# Patient Record
Sex: FEMALE | Race: WHITE | Employment: OTHER | URBAN - METROPOLITAN AREA
[De-identification: names, ages, dates, MRNs, and addresses within clinical notes are randomized per-mention and may not be internally consistent; named-entity substitution may affect disease eponyms.]

---

## 2017-07-10 NOTE — PROGRESS NOTES
Mike Been : 1940     HPI:   Patient presents with:  Seizure Disorder (neurologic): LOV: 16. F/U on seizures. Patient states that she has been doing good since last visit. She has not had any seizures.  She has continued with depakot mirtazapine (REMERON) 30 MG Oral Tab Take 1 tablet (30 mg total) by mouth nightly.  Disp: 30 tablet Rfl: 3   Montelukast Sodium (SINGULAIR) 10 MG Oral Tab TAKE ONE TABLET BY MOUTH EVERY NIGHT Disp: 30 tablet Rfl: 3   sucralfate (CARAFATE) 1 G Oral Tab YESSY Comment:Coffee - 1 cup per day           ROS:   GENERAL HEALTH: feels well otherwise  SKIN: denies any unusual skin lesions or rashes  EYES: no visual complaints or deficits  HEENT: denies nasal congestion, sinus pain or sore throat; hearing loss negative controlled on Depakote and the same dose will be continued. Recent CBC and liver function tests were unremarkable, and I did review these results with the patient and family.   I discussed restarting medicine for her memory, and suggested she take Aricept

## 2017-08-03 ENCOUNTER — HOSPITAL ENCOUNTER (EMERGENCY)
Facility: HOSPITAL | Age: 77
Discharge: HOME | End: 2017-08-03
Attending: EMERGENCY MEDICINE
Payer: MEDICARE

## 2017-08-03 ENCOUNTER — APPOINTMENT (OUTPATIENT)
Dept: GENERAL RADIOLOGY | Facility: HOSPITAL | Age: 77
End: 2017-08-03
Attending: EMERGENCY MEDICINE
Payer: MEDICARE

## 2017-08-03 VITALS
OXYGEN SATURATION: 95 % | HEART RATE: 83 BPM | RESPIRATION RATE: 20 BRPM | SYSTOLIC BLOOD PRESSURE: 109 MMHG | HEIGHT: 62 IN | DIASTOLIC BLOOD PRESSURE: 56 MMHG | TEMPERATURE: 98 F | BODY MASS INDEX: 23.92 KG/M2 | WEIGHT: 130 LBS
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
## 2017-08-03 DIAGNOSIS — S39.012A LOW BACK STRAIN, INITIAL ENCOUNTER: ICD-10-CM

## 2017-08-03 LAB
ANION GAP SERPL CALC-SCNC: 12 MMOL/L (ref 0–18)
BASOPHILS # BLD: 0 K/UL (ref 0–0.2)
BASOPHILS NFR BLD: 1 %
BUN SERPL-MCNC: 11 MG/DL (ref 8–20)
BUN/CREAT SERPL: 13.3 (ref 10–20)
CALCIUM SERPL-MCNC: 9.2 MG/DL (ref 8.5–10.5)
CHLORIDE SERPL-SCNC: 105 MMOL/L (ref 95–110)
CO2 SERPL-SCNC: 25 MMOL/L (ref 22–32)
CREAT SERPL-MCNC: 0.83 MG/DL (ref 0.5–1.5)
EOSINOPHIL # BLD: 0.2 K/UL (ref 0–0.7)
EOSINOPHIL NFR BLD: 3 %
ERYTHROCYTE [DISTWIDTH] IN BLOOD BY AUTOMATED COUNT: 14 % (ref 11–15)
ETHANOL SERPL-MCNC: 186 MG/DL
GLUCOSE SERPL-MCNC: 109 MG/DL (ref 70–99)
HCT VFR BLD AUTO: 38.2 % (ref 35–48)
HGB BLD-MCNC: 12.9 G/DL (ref 12–16)
LYMPHOCYTES # BLD: 1.7 K/UL (ref 1–4)
LYMPHOCYTES NFR BLD: 34 %
MCH RBC QN AUTO: 31.5 PG (ref 27–32)
MCHC RBC AUTO-ENTMCNC: 33.8 G/DL (ref 32–37)
MCV RBC AUTO: 93.1 FL (ref 80–100)
MONOCYTES # BLD: 0.2 K/UL (ref 0–1)
MONOCYTES NFR BLD: 5 %
NEUTROPHILS # BLD AUTO: 2.9 K/UL (ref 1.8–7.7)
NEUTROPHILS NFR BLD: 57 %
OSMOLALITY UR CALC.SUM OF ELEC: 294 MOSM/KG (ref 275–295)
PLATELET # BLD AUTO: 170 K/UL (ref 140–400)
PMV BLD AUTO: 7.1 FL (ref 7.4–10.3)
POTASSIUM SERPL-SCNC: 3.9 MMOL/L (ref 3.3–5.1)
RBC # BLD AUTO: 4.1 M/UL (ref 3.7–5.4)
SODIUM SERPL-SCNC: 142 MMOL/L (ref 136–144)
VALPROATE SERPL-MCNC: 61 MCG/ML (ref 50–100)
WBC # BLD AUTO: 5 K/UL (ref 4–11)

## 2017-08-03 PROCEDURE — 99285 EMERGENCY DEPT VISIT HI MDM: CPT

## 2017-08-03 PROCEDURE — 93010 ELECTROCARDIOGRAM REPORT: CPT | Performed by: EMERGENCY MEDICINE

## 2017-08-03 PROCEDURE — 93005 ELECTROCARDIOGRAM TRACING: CPT

## 2017-08-03 PROCEDURE — 72100 X-RAY EXAM L-S SPINE 2/3 VWS: CPT | Performed by: EMERGENCY MEDICINE

## 2017-08-03 PROCEDURE — 72220 X-RAY EXAM SACRUM TAILBONE: CPT | Performed by: EMERGENCY MEDICINE

## 2017-08-03 PROCEDURE — 80048 BASIC METABOLIC PNL TOTAL CA: CPT | Performed by: EMERGENCY MEDICINE

## 2017-08-03 PROCEDURE — 85025 COMPLETE CBC W/AUTO DIFF WBC: CPT | Performed by: EMERGENCY MEDICINE

## 2017-08-03 PROCEDURE — 80320 DRUG SCREEN QUANTALCOHOLS: CPT | Performed by: EMERGENCY MEDICINE

## 2017-08-03 PROCEDURE — 80164 ASSAY DIPROPYLACETIC ACD TOT: CPT | Performed by: EMERGENCY MEDICINE

## 2017-08-03 PROCEDURE — 96360 HYDRATION IV INFUSION INIT: CPT

## 2017-08-03 RX ORDER — VIT A/VIT C/VIT E/ZINC/COPPER 2148-113
TABLET ORAL
COMMUNITY

## 2017-08-03 NOTE — ED INITIAL ASSESSMENT (HPI)
Hx dementia. Fall a home. Denies any pain. +ETOH reports 7 drinks today of wine.  Denies head injury

## 2017-08-04 NOTE — ED PROVIDER NOTES
Patient Seen in: [facility] AND [facility] Emergency Department    History   Patient presents with:  Fall (musculoskeletal, neurologic)    Stated Complaint: ETOH Fall    HPI    Patient is a 44-year-old female from home with history of dementia who presents with EVERY NIGHT   sucralfate (CARAFATE) 1 G Oral Tab,  TAKE ONE TABLET BY MOUTH FOUR TIMES A DAY   omeprazole (PRILOSEC) 20 MG Oral Capsule Delayed Release,  TAKE ONE CAPSULE BY MOUTH EVERY DAY BEFORE A MEAL   Albuterol Sulfate HFA (VENTOLIN HFA) 108 (90 BASE) Hips stable  Neuro: CN intact, normal speech, 5/5 motor strength in all extremities, no focal deficits  SKIN: warm, dry, no rashes        ED Course     Labs Reviewed   BASIC METABOLIC PANEL (8) - Abnormal; Notable for the following:        Result Value moderate levoscoliosis of the lumbar spine. 4. Lower lumbar degenerative changes, most pronounced at L5-S1. Family at bedside, notified of normal xrays. Pt continues to deny any pain or symptoms. Feels comfortable with discharge. Able to ambulate.

## 2017-08-04 NOTE — ED NOTES
Discharge instructions reviewed with patient including when to follow up with healthcare providers and when to seek emergency care. Peripheral IV discontinued. Patient dressed self. Ambulated to exit with steady gait.

## 2018-01-18 ENCOUNTER — OFFICE VISIT (OUTPATIENT)
Dept: NEUROLOGY | Facility: CLINIC | Age: 78
End: 2018-01-18

## 2018-01-18 VITALS
RESPIRATION RATE: 16 BRPM | HEIGHT: 64 IN | WEIGHT: 130 LBS | DIASTOLIC BLOOD PRESSURE: 86 MMHG | SYSTOLIC BLOOD PRESSURE: 136 MMHG | HEART RATE: 80 BPM | BODY MASS INDEX: 22.2 KG/M2

## 2018-01-18 DIAGNOSIS — G40.909 SEIZURE DISORDER (HCC): Primary | ICD-10-CM

## 2018-01-18 DIAGNOSIS — R41.89 COGNITIVE IMPAIRMENT: ICD-10-CM

## 2018-01-18 DIAGNOSIS — I67.1 CEREBRAL ANEURYSM: ICD-10-CM

## 2018-01-18 PROCEDURE — 99213 OFFICE O/P EST LOW 20 MIN: CPT | Performed by: OTHER

## 2018-01-18 RX ORDER — DIVALPROEX SODIUM 500 MG/1
500 TABLET, DELAYED RELEASE ORAL DAILY
COMMUNITY
End: 2018-08-29

## 2018-01-18 RX ORDER — DONEPEZIL HYDROCHLORIDE 10 MG/1
10 TABLET, FILM COATED ORAL NIGHTLY
COMMUNITY

## 2018-01-18 NOTE — PROGRESS NOTES
Viry Bautistar : 1940     HPI:   Patient presents with:  Seizures: Patient presents for follow up on seizures, denied recent seizures. Taking depakote 500 mg 2 tabs daily at night.        Mayela Hood was seen for neurologic evaluation J MOUTH EVERY NIGHT Disp: 30 tablet Rfl: 3   sucralfate (CARAFATE) 1 G Oral Tab TAKE ONE TABLET BY MOUTH FOUR TIMES A DAY (Patient taking differently: Takes 1 tab daily) Disp: 120 tablet Rfl: 3   omeprazole (PRILOSEC) 20 MG Oral Capsule Delayed Release TAKE any unusual skin lesions or rashes  EYES: no visual complaints or deficits  HEENT: denies nasal congestion, sinus pain or sore throat; hearing loss negative  RESPIRATORY: denies shortness of breath, wheezing or cough   CARDIOVASCULAR: denies chest pain or suggesting a dementia. She has been stable, raising the possibility some of the issues may be secondary to postop changes noted on the previous CT scan. Clinically she has been doing well, so the same medications will be continued.   The daughter will yazan

## 2018-08-29 ENCOUNTER — OFFICE VISIT (OUTPATIENT)
Dept: NEUROLOGY | Facility: CLINIC | Age: 78
End: 2018-08-29
Payer: MEDICARE

## 2018-08-29 VITALS
HEIGHT: 63 IN | DIASTOLIC BLOOD PRESSURE: 64 MMHG | HEART RATE: 72 BPM | SYSTOLIC BLOOD PRESSURE: 110 MMHG | WEIGHT: 120 LBS | BODY MASS INDEX: 21.26 KG/M2

## 2018-08-29 DIAGNOSIS — R41.89 COGNITIVE IMPAIRMENT: ICD-10-CM

## 2018-08-29 DIAGNOSIS — R56.9 SEIZURE (HCC): Primary | ICD-10-CM

## 2018-08-29 PROCEDURE — 99214 OFFICE O/P EST MOD 30 MIN: CPT | Performed by: OTHER

## 2018-08-29 RX ORDER — DIVALPROEX SODIUM 500 MG/1
1000 TABLET, DELAYED RELEASE ORAL DAILY
Qty: 180 TABLET | Refills: 3 | Status: SHIPPED | OUTPATIENT
Start: 2018-08-29 | End: 2019-08-23

## 2018-08-29 NOTE — PROGRESS NOTES
Neurology Follow up Visit     Referred By: Dr. Leiva ref. provider found    Chief Complaint: Patient presents with:  Seizures: LOV w/Dr Rodriguez Bias 1-18-18 pt is here to f/u on seizures.  pt denies any new episodes since last office visit, currently taking dival Cause of death   • Cancer Other    • Diabetes Other    • Stroke Sister 80         Current Outpatient Prescriptions:   •  divalproex Sodium 500 MG Oral Tab EC DR tab, Take 2 tablets (1,000 mg total) by mouth daily.  Takes 2 daily at night, Disp: 180 tabl Neurological:     Mental Status- Alert and oriented x2.   Normal attention span and concentration  Thought process intact  Memory  mildly impaired  Mood intact  Fund of knowledge appropriate for education and age    Language intact including: comprehens 08/03/2017   CO2 25 08/03/2017      I have reviewed labs. Assessment   1. Seizure [facility])  Patient with a well-controlled epilepsy, however her recent level of Depakote checked couple months ago was some bone, it can be rechecked.   In the meantime we wi

## 2018-09-06 ENCOUNTER — MED REC SCAN ONLY (OUTPATIENT)
Dept: NEUROLOGY | Facility: CLINIC | Age: 78
End: 2018-09-06

## 2018-09-18 RX ORDER — DONEPEZIL HYDROCHLORIDE 10 MG/1
TABLET, FILM COATED ORAL
Qty: 90 TABLET | Refills: 3 | Status: SHIPPED | OUTPATIENT
Start: 2018-09-18 | End: 2019-06-26

## 2019-06-26 ENCOUNTER — OFFICE VISIT (OUTPATIENT)
Dept: NEUROLOGY | Facility: CLINIC | Age: 79
End: 2019-06-26
Payer: MEDICARE

## 2019-06-26 VITALS
WEIGHT: 129 LBS | RESPIRATION RATE: 16 BRPM | HEART RATE: 76 BPM | SYSTOLIC BLOOD PRESSURE: 118 MMHG | HEIGHT: 59 IN | DIASTOLIC BLOOD PRESSURE: 68 MMHG | BODY MASS INDEX: 26 KG/M2

## 2019-06-26 DIAGNOSIS — G40.909 SEIZURE DISORDER (HCC): ICD-10-CM

## 2019-06-26 DIAGNOSIS — R41.89 COGNITIVE IMPAIRMENT: ICD-10-CM

## 2019-06-26 DIAGNOSIS — R56.9 SEIZURE (HCC): Primary | ICD-10-CM

## 2019-06-26 PROCEDURE — 99214 OFFICE O/P EST MOD 30 MIN: CPT | Performed by: OTHER

## 2019-06-26 RX ORDER — DOCUSATE SODIUM 100 MG/1
100 CAPSULE, LIQUID FILLED ORAL 2 TIMES DAILY PRN
COMMUNITY

## 2019-06-26 RX ORDER — DIVALPROEX SODIUM 250 MG/1
250 TABLET, EXTENDED RELEASE ORAL DAILY
Qty: 90 TABLET | Refills: 3 | Status: SHIPPED | OUTPATIENT
Start: 2019-06-26

## 2019-06-26 NOTE — PATIENT INSTRUCTIONS
Caregiver Resources:    [facility] ([facility] [place]):     of Senior Services: Sonu Mckenzie   Phone:   [phone] or  [phone]  Fax:   [phone]  TDD:   [phone]  Office Hours:   8 a.m. - 4:30 p.m.   Monday -

## 2019-07-13 ENCOUNTER — HOSPITAL ENCOUNTER (OUTPATIENT)
Dept: MAMMOGRAPHY | Facility: HOSPITAL | Age: 79
Discharge: HOME | End: 2019-07-13
Attending: PEDIATRICS
Payer: MEDICARE

## 2019-07-13 DIAGNOSIS — Z12.31 ENCOUNTER FOR SCREENING MAMMOGRAM FOR MALIGNANT NEOPLASM OF BREAST: ICD-10-CM

## 2019-07-13 PROCEDURE — 77067 SCR MAMMO BI INCL CAD: CPT | Performed by: PEDIATRICS

## 2019-07-13 PROCEDURE — 77063 BREAST TOMOSYNTHESIS BI: CPT | Performed by: PEDIATRICS

## 2019-08-23 RX ORDER — DIVALPROEX SODIUM 500 MG/1
TABLET, DELAYED RELEASE ORAL
Qty: 180 TABLET | Refills: 2 | Status: SHIPPED | OUTPATIENT
Start: 2019-08-23

## 2019-08-23 NOTE — TELEPHONE ENCOUNTER
Divalproex 500mg. Take 2 tablet daily at HS.  #180    Last filled-8/29/2018    LOV-6/26/2019  NOV-none

## 2019-09-05 RX ORDER — DONEPEZIL HYDROCHLORIDE 10 MG/1
TABLET, FILM COATED ORAL
Qty: 90 TABLET | Refills: 2 | Status: SHIPPED | OUTPATIENT
Start: 2019-09-05 | End: 2020-06-01

## 2020-06-01 RX ORDER — DONEPEZIL HYDROCHLORIDE 10 MG/1
TABLET, FILM COATED ORAL
Qty: 90 TABLET | Refills: 0 | Status: SHIPPED | OUTPATIENT
Start: 2020-06-01

## 2020-07-08 RX ORDER — DIVALPROEX SODIUM 250 MG/1
TABLET, EXTENDED RELEASE ORAL
Qty: 90 TABLET | Refills: 0 | OUTPATIENT
Start: 2020-07-08

## 2020-08-18 ENCOUNTER — APPOINTMENT (OUTPATIENT)
Dept: GENERAL RADIOLOGY | Facility: HOSPITAL | Age: 80
End: 2020-08-18
Attending: EMERGENCY MEDICINE
Payer: MEDICARE

## 2020-08-18 PROCEDURE — 71045 X-RAY EXAM CHEST 1 VIEW: CPT | Performed by: EMERGENCY MEDICINE

## 2020-08-18 RX ORDER — DIVALPROEX SODIUM 500 MG/1
TABLET, DELAYED RELEASE ORAL
Qty: 180 TABLET | Refills: 0 | OUTPATIENT
Start: 2020-08-18

## 2020-08-18 NOTE — CM/SW NOTE
Spoke to [place] at the pt's bedside. Provided her with information on \"[facility]\" and Myrna's #. Demetria Jones will be calling her today. The pt will be going home with [place] today and her brother will also assist in caring for their mom.  The pt lives i

## 2020-08-18 NOTE — CM/SW NOTE
I spoke to Alejo Roth, the pt's daughter, and she will be coming to the ED within the hour. She will be taking her mom home with her but is looking for solutions in caring for her mom. I will speak with her when she arrives.

## 2020-08-18 NOTE — ED INITIAL ASSESSMENT (HPI)
Pt via EMS after medics found pt in her apartment with her  roommate who apparently had been  for several days per medics report of smell and flies.   Pt was found eating a blueberry muffin and horseradish and sitting next to the  austen

## 2020-08-18 NOTE — ED PROVIDER NOTES
Patient Seen in: [facility] AND [facility] Emergency Department      History   Patient presents with:  Wellness Visit    Stated Complaint: Wellness check    HPI    57-year-old female with history of asthma, cerebral aneurysm, seizure disorder, and memory loss (d Current:/79   Pulse 83   Temp 98 °F (36.7 °C) (Oral)   Resp 20   Ht 152.4 cm (5')   Wt 45.4 kg   SpO2 98%   BMI 19.53 kg/m²         Physical Exam      General Appearance: alert, no distress  Eyes: pupils equal and round no pallor or injection The patient's daughter now accompanies the patient in the ED. She states that the patient is functioning at her baseline mental status with worsening dementia over the past couple of years.   The patient and her daughter spoke with the  who is

## 2020-08-18 NOTE — CM/SW NOTE
The pt is confused and is not oriented to place or time. She has a son and daughter but does not know where they live. She does not know who her Dr is or what street she lives on.  She does not know why she is here, and not realizing that \"here\" is the [facility]

## 2020-08-18 NOTE — CM/SW NOTE
I spoke to ΠΑΦΟΣ at [facility] and provided information about the referral in assistance for memory care placement. She will contact [place] today.

## 2020-08-26 RX ORDER — DONEPEZIL HYDROCHLORIDE 10 MG/1
TABLET, FILM COATED ORAL
Qty: 90 TABLET | Refills: 0 | OUTPATIENT
Start: 2020-08-26

## 2020-08-26 NOTE — ED NOTES
Spoke to Elisa at [facility] who states that there are no appropriate beds right now but that the packet will be faxed to [facility] to be reviewed after 9 AM. She also states that because the packet is \"active\", the patient will be considered if

## 2020-08-26 NOTE — ED NOTES
Pt is accepted at [facility] by Dr. Lopes Crew. Pt will be going to [address] [place] [address], bed 3303-2. Tommie requests the pt be sent at 8pm.    RN notified.

## 2020-08-26 NOTE — ED NOTES
Transfer Summary     JASON - no appropriate beds  Elsa Arenas accepted clinical information but was told there are 5 people ahead of the patient, packet faxed for review  Lawyer Garcia accepted based clinical information and packet was faxed for review.

## 2020-08-26 NOTE — ED NOTES
Spoke w/ pt's daughter. Updated her that I did speak to MARI about an hour ago and they were still waiting on a decision from [facility], they planned to call me as soon as they heard if there would be a bed. If not, I will contact [facility] again.  Will call P

## 2020-08-26 NOTE — ED NOTES
Pt accepted at [facility] by Dr. Roseann Rico Requested an updated P&C. States they will call back and let us know when we can send the pt.

## 2020-08-26 NOTE — ED NOTES
Gave report to Mariusz at [facility].   Requests medication list to be faxed to [phone] or [phone]

## 2020-08-26 NOTE — ED NOTES
[employer] - called for an update.  Spoke with Nikia Hills and she said they were told the POA would not approve transfer so they have not been working on request.

## 2020-08-26 NOTE — ED PROVIDER NOTES
Patient Seen in: [facility] Emergency Department      History   Patient presents with:  Eval-P    Stated Complaint: SI dementia    HPI    The patient is a 71-year-old female with a history of dementia that is been worsening over the last year per 96   Resp 18   Temp 98.1 °F (36.7 °C)   Temp src Oral   SpO2 97 %   O2 Device None (Room air)       Current:/74   Pulse 96   Temp 98.1 °F (36.7 °C) (Oral)   Resp 18   Wt 45.4 kg   SpO2 97%   BMI 19.53 kg/m²         Physical Exam  Vitals signs and ag BASIC METABOLIC PANEL (8) - Abnormal; Notable for the following components:       Result Value    Glucose 117 (*)     BUN/CREA Ratio 22.7 (*)     Calculated Osmolality 299 (*)     All other components within normal limits   URINALYSIS WITH CULTURE REFLEX unspecified dementia type [facility])    Disposition:  Psychiatric transfer  8/25/2020 10:26 pm    Follow-up:  No follow-up provider specified.   We recommend that you schedule follow up care with a primary care provider within the next three months to obtain bas

## 2020-08-26 NOTE — BH LEVEL OF CARE ASSESSMENT
Level of Care Assessment Note    General Questions  Why are you here?: \"I don't rememebr what I did\"  Precipitating Events: Patient has been staying with her daughter recently and she is concerned about the change in patient's behavior.  Patient has been I have been trying to get her melatonin. I have to go to work and I am worried what she might do. \"  Family's Biggest Areas of Concern: Safety.  Daughter is worried about her constantly saying she is going to kill herself and her frequently saying she is g a firearm owner ID card?: No  Collateral for any access to means/firearms/weapons: No guns/weapons in the daughter's home    Self Injury  History of Self-Injurious Behaviors More than 30 Days Ago: No  Present Self-Injurious Behaviors Within the Last 30 Day Glasses  Patient able to understand and follow directions?: Yes  Patient able to express needs?: Yes  Feeding and Swallowing  History of aspiration or choking?: No  Feeding assistance needed?: No  Patient have any chewing or swallowing problems?: No  Speci alcohol? : Never       Illicit and Prescription Drug Use  Which if any illicit/prescription drugs have you used/abused?: Denies                                                                Support for Recovery  Is your living environment a supportive analilia Affect: Stable  Attitude toward staff: Co-operative  Speech  Rate of Speech: Appropriate  Flow of Speech: Appropriate  Intensity of Volume: Ordinary  Clarity: Clear  Cognition  Concentration: Unimpaired  Orientation Level: Oriented to person;Oriented to si Factors: Current/past psychiatric disorder;Current suicidal behavior; Environmental stress; No current treatment  Protective Factors: Family    Motivational Stage of Change  Motivational Stage of Change: Pre-Contemplative    Level of Care Recommendations  Co

## 2020-08-26 NOTE — ED NOTES
Met with patient's daughter who is the medical POA to help her complete a petition for psychiatric hospitalization. After consulting with the MD, patient's daughter for explained the POC, admission process and mental health rights.  She signed her mother in

## 2020-08-26 NOTE — ED NOTES
Jeanine Knowles still has no official update from [facility].

## 2020-08-26 NOTE — ED NOTES
Spoke with Elsa. States there still are no beds at [facility]. Requested that I speak with the POA (daughter) to see if she would be open to other facilities. Spoke with pt's daughter and she is open to [employer] and [facility].  Called Elsa back to rela

## 2020-08-26 NOTE — ED NOTES
Iris will review after 9a - this is POA's preference. [facility] continuing to review and does have possibility of bed per overnight.

## 2020-08-26 NOTE — ED NOTES
Iris still in their morning meeting. [address] waiting to hear back and will contact writer with update asap.

## 2020-08-26 NOTE — ED INITIAL ASSESSMENT (HPI)
Pt brought in by daughter. Daughter reports pt has been saying she want to kill herself for past 2 days. Pt has a hx of dementia. Pt denies SI/HI statements, denies SI/HI at this time. Stating \"I don't remember saying that\".

## 2020-08-27 NOTE — ED NOTES
Realized that though pt's POA requested [facility]. The pt's POA was not told that the pt was accepted and transferred. Spoke with Keyur Frias (kimberly and DIONE) to notify her.

## 2020-08-27 NOTE — ED NOTES
Pt sitting in hallway in chair with seclusion in place. Pt is calm, cooperative. RN called pharmacy for PM meds, so they can be administered prior to transfer.

## 2021-03-03 DIAGNOSIS — Z23 NEED FOR VACCINATION: ICD-10-CM

## 2022-10-14 ENCOUNTER — LAB REQUISITION (OUTPATIENT)
Dept: LAB | Age: 82
End: 2022-10-14

## 2022-10-14 DIAGNOSIS — Z13.9 ENCOUNTER FOR SCREENING, UNSPECIFIED: ICD-10-CM

## 2022-10-14 PROCEDURE — PSEU8226 VALPROIC ACID: Performed by: CLINICAL MEDICAL LABORATORY

## 2022-10-14 PROCEDURE — 80164 ASSAY DIPROPYLACETIC ACD TOT: CPT | Performed by: CLINICAL MEDICAL LABORATORY

## 2022-10-15 LAB — VALPROATE SERPL-MCNC: <3 MCG/ML (ref 50–125)

## 2022-11-22 NOTE — TELEPHONE ENCOUNTER
LOV: 6/26/19. Follow-up appointment needed. Azathioprine Pregnancy And Lactation Text: This medication is Pregnancy Category D and isn't considered safe during pregnancy. It is unknown if this medication is excreted in breast milk.

## (undated) NOTE — LETTER
[address], [address], [address] [facility], [address] [place] [address], [address]  . [address]  [phone]        Dear Carol Tai MD,      I had the pleasure of seeing your patient, Raza Sat on 1/18/2018.      Be aspirin 81 MG Oral Tab Take 81 mg by mouth daily. Disp:  Rfl:    COCONUT OIL OR Take by mouth.  Disp:  Rfl:    Albuterol Sulfate HFA (VENTOLIN HFA) 108 (90 BASE) MCG/ACT Inhalation Aero Soln Inhale 2 puffs into the lungs every 4 (four) hours as needed for W Smoking status: Former Smoker                                                                Packs/day: 2.00      Years: 0.00           Types: Cigarettes       Quit date: 1/1/1980    Smokeless tobacco: Never Used                        Alcohol use:  No elevates in midline. XI- Shoulder shrug symmetric. XII- Tongue in midline, without atrophy. Motor: 5/5 strength in the upper and lower extremities. Tone normal. No pronator drift . Gait: Narrow based, negative Romberg’s sign.  Can stand on heels

## (undated) NOTE — ED AVS SNAPSHOT
Pricila Tamayo   MRN: [id number]    Department:  [facility] Emergency Department   Date of Visit:  8/3/2017           Disclosure     Insurance plans vary and the physician(s) referred by the ER may not be covered by your plan.  Please cont CARE PHYSICIAN AT ONCE OR RETURN IMMEDIATELY TO THE EMERGENCY DEPARTMENT. If you have been prescribed any medication(s), please fill your prescription right away and begin taking the medication(s) as directed.   If you believe that any of the medications

## (undated) NOTE — LETTER
[address] [facility]  [address], [address] [facility]  [address] [facility] [phone]        Dear Demetra Freitas MD,      I had the pleasure of seeing your patient, Rochelle Frias on 7/10/2017.      Below please find Disp: 90 tablet Rfl: 3   VERAPAMIL HCL  MG Oral Tab CR TAKE ONE TABLET BY MOUTH EVERY EVENING Disp: 30 tablet Rfl: 1   aspirin 81 MG Oral Tab Take 81 mg by mouth daily. Disp:  Rfl:    COCONUT OIL OR Take by mouth.  Disp:  Rfl:    Albuterol Sulfate HFA Social History Main Topics    Smoking status: Former Smoker                                                                Packs/day: 2.00      Years: 0.00           Types: Cigarettes       Quit date: 1/1/1980    Smokeless tobacco: Never Used pronator drift . DTR: 2+ in the upper and lower extremities,  No Babinski, no hoffmans, no clonus  Coordination: Finger to nose, heel to shin intact bilaterally. Gait: Narrow based, negative Romberg’s sign. Can stand on heels and toes.     ASSESSMENT A